# Patient Record
Sex: FEMALE | Race: ASIAN | Employment: FULL TIME | URBAN - METROPOLITAN AREA
[De-identification: names, ages, dates, MRNs, and addresses within clinical notes are randomized per-mention and may not be internally consistent; named-entity substitution may affect disease eponyms.]

---

## 2017-06-14 RX ORDER — ALBUTEROL SULFATE 90 UG/1
2 AEROSOL, METERED RESPIRATORY (INHALATION)
Qty: 1 INHALER | Refills: 2 | Status: SHIPPED | OUTPATIENT
Start: 2017-06-14 | End: 2018-06-26 | Stop reason: SDUPTHER

## 2017-08-05 ENCOUNTER — HOSPITAL ENCOUNTER (EMERGENCY)
Age: 37
Discharge: HOME | End: 2017-08-05
Attending: EMERGENCY MEDICINE

## 2017-08-05 VITALS
SYSTOLIC BLOOD PRESSURE: 177 MMHG | RESPIRATION RATE: 16 BRPM | TEMPERATURE: 98.3 F | DIASTOLIC BLOOD PRESSURE: 122 MMHG | BODY MASS INDEX: 30.12 KG/M2 | OXYGEN SATURATION: 98 % | HEIGHT: 63 IN | WEIGHT: 170 LBS | HEART RATE: 80 BPM

## 2017-08-05 DIAGNOSIS — J06.9 ACUTE UPPER RESPIRATORY INFECTION: Primary | ICD-10-CM

## 2017-08-05 DIAGNOSIS — I10 ESSENTIAL HYPERTENSION: ICD-10-CM

## 2017-08-05 NOTE — UC PROVIDER NOTE
Patient is a 39 y.o. female presenting with cold symptoms. The history is provided by the patient. Cold Symptoms    This is a new problem. The current episode started more than 2 days ago (since Lawrence sx with persistent sx since the onset ). The problem has not changed since onset. There has been no fever. Associated symptoms include congestion, plugged ear sensation (left), rhinorrhea and sore throat (Wed and Thurs but none since). Pertinent negatives include no chest pain, no diarrhea, no nausea, no ear pain (left ear fullness), no headaches, no sinus pain, no sneezing, no swollen glands, no joint pain, no joint swelling, no neck pain, no cough, no rash and no wheezing. Treatments tried: OTC cold medication -Nyquil-took last night. Past Medical History:   Diagnosis Date    Allergy, unspecified not elsewhere classified     Asthma childhood    Chicken pox childhood    Unspecified vitamin D deficiency 06/2011        History reviewed. No pertinent surgical history. Family History   Problem Relation Age of Onset    Diabetes Maternal Grandmother     Hypertension Mother     Asthma Mother     Heart Attack Maternal Grandmother     Stroke Maternal Grandmother         Social History     Social History    Marital status: SINGLE     Spouse name: N/A    Number of children: N/A    Years of education: N/A     Occupational History    Not on file. Social History Main Topics    Smoking status: Never Smoker    Smokeless tobacco: Never Used    Alcohol use Yes      Comment: occasionally monthly    Drug use: No    Sexual activity: Yes     Partners: Male     Birth control/ protection: Condom     Other Topics Concern    Not on file     Social History Narrative                ALLERGIES: Nut flavor and Nuts [tree nut]    Review of Systems   Constitutional: Negative for activity change, chills, fatigue and fever.    HENT: Positive for congestion, rhinorrhea and sore throat (Wed and Thurs but none since). Negative for ear pain (left ear fullness), sinus pressure and sneezing. Respiratory: Negative for cough and wheezing. Cardiovascular: Negative for chest pain. Gastrointestinal: Negative for diarrhea and nausea. Musculoskeletal: Negative for joint pain and neck pain. Skin: Negative for rash. Neurological: Negative for weakness and headaches. Vitals:    08/05/17 1112 08/05/17 1113   BP:  (!) 176/106   Pulse:  80   Resp:  16   Temp:  98.3 °F (36.8 °C)   SpO2:  98%   Weight: 77.1 kg (170 lb)    Height: 5' 3\" (1.6 m)        Physical Exam   Constitutional: She is oriented to person, place, and time. She appears well-developed and well-nourished. HENT:   Head: Normocephalic and atraumatic. Mouth/Throat: Oropharynx is clear and moist.   Nasal congestion, No sinus pain, PND, BL fluid behind the TM's without erythema     Eyes: Conjunctivae and EOM are normal.   Neck: Normal range of motion. Neck supple. Cardiovascular: Normal rate, regular rhythm and normal heart sounds. Pulmonary/Chest: Effort normal and breath sounds normal.   Lymphadenopathy:     She has no cervical adenopathy. Neurological: She is alert and oriented to person, place, and time. No cranial nerve deficit. Coordination normal.   Skin: Skin is warm and dry. Psychiatric: She has a normal mood and affect. Her behavior is normal. Thought content normal.   Nursing note and vitals reviewed. MDM     Differential Diagnosis; Clinical Impression; Plan:     Pt aware of BP and has been following it with her PCP for years but says it is better when she watches her diet and she said she will follow this at home and avoid cold medication  CLINICAL IMPRESSION:  Acute upper respiratory infection  (primary encounter diagnosis)  Essential hypertension    Plan:  1. Supportive care  2. BP recheck and close FU  3. Risk of Significant Complications, Morbidity, and/or Mortality:   Presenting problems:   Moderate  Management options: Moderate  Progress:   Patient progress:  Stable      Procedures

## 2017-08-05 NOTE — DISCHARGE INSTRUCTIONS
High Blood Pressure: Care Instructions  Your Care Instructions  If your blood pressure is usually above 140/90, you have high blood pressure, or hypertension. That means the top number is 140 or higher or the bottom number is 90 or higher, or both. Despite what a lot of people think, high blood pressure usually doesn't cause headaches or make you feel dizzy or lightheaded. It usually has no symptoms. But it does increase your risk for heart attack, stroke, and kidney or eye damage. The higher your blood pressure, the more your risk increases. Your doctor will give you a goal for your blood pressure. Your goal will be based on your health and your age. An example of a goal is to keep your blood pressure below 140/90. Lifestyle changes, such as eating healthy and being active, are always important to help lower blood pressure. You might also take medicine to reach your blood pressure goal.  Follow-up care is a key part of your treatment and safety. Be sure to make and go to all appointments, and call your doctor if you are having problems. It's also a good idea to know your test results and keep a list of the medicines you take. How can you care for yourself at home? Medical treatment  · If you stop taking your medicine, your blood pressure will go back up. You may take one or more types of medicine to lower your blood pressure. Be safe with medicines. Take your medicine exactly as prescribed. Call your doctor if you think you are having a problem with your medicine. · Talk to your doctor before you start taking aspirin every day. Aspirin can help certain people lower their risk of a heart attack or stroke. But taking aspirin isn't right for everyone, because it can cause serious bleeding. · See your doctor regularly. You may need to see the doctor more often at first or until your blood pressure comes down.   · If you are taking blood pressure medicine, talk to your doctor before you take decongestants or anti-inflammatory medicine, such as ibuprofen. Some of these medicines can raise blood pressure. · Learn how to check your blood pressure at home. Lifestyle changes  · Stay at a healthy weight. This is especially important if you put on weight around the waist. Losing even 10 pounds can help you lower your blood pressure. · If your doctor recommends it, get more exercise. Walking is a good choice. Bit by bit, increase the amount you walk every day. Try for at least 30 minutes on most days of the week. You also may want to swim, bike, or do other activities. · Avoid or limit alcohol. Talk to your doctor about whether you can drink any alcohol. · Try to limit how much sodium you eat to less than 2,300 milligrams (mg) a day. Your doctor may ask you to try to eat less than 1,500 mg a day. · Eat plenty of fruits (such as bananas and oranges), vegetables, legumes, whole grains, and low-fat dairy products. · Lower the amount of saturated fat in your diet. Saturated fat is found in animal products such as milk, cheese, and meat. Limiting these foods may help you lose weight and also lower your risk for heart disease. · Do not smoke. Smoking increases your risk for heart attack and stroke. If you need help quitting, talk to your doctor about stop-smoking programs and medicines. These can increase your chances of quitting for good. When should you call for help? Call 911 anytime you think you may need emergency care. This may mean having symptoms that suggest that your blood pressure is causing a serious heart or blood vessel problem. Your blood pressure may be over 180/110. For example, call 911 if:  · You have symptoms of a heart attack. These may include:  ¨ Chest pain or pressure, or a strange feeling in the chest.  ¨ Sweating. ¨ Shortness of breath. ¨ Nausea or vomiting. ¨ Pain, pressure, or a strange feeling in the back, neck, jaw, or upper belly or in one or both shoulders or arms.   ¨ Lightheadedness or sudden weakness. ¨ A fast or irregular heartbeat. · You have symptoms of a stroke. These may include:  ¨ Sudden numbness, tingling, weakness, or loss of movement in your face, arm, or leg, especially on only one side of your body. ¨ Sudden vision changes. ¨ Sudden trouble speaking. ¨ Sudden confusion or trouble understanding simple statements. ¨ Sudden problems with walking or balance. ¨ A sudden, severe headache that is different from past headaches. · You have severe back or belly pain. Do not wait until your blood pressure comes down on its own. Get help right away. Call your doctor now or seek immediate care if:  · Your blood pressure is much higher than normal (such as 180/110 or higher), but you don't have symptoms. · You think high blood pressure is causing symptoms, such as:  ¨ Severe headache. ¨ Blurry vision. Watch closely for changes in your health, and be sure to contact your doctor if:  · Your blood pressure measures 140/90 or higher at least 2 times. That means the top number is 140 or higher or the bottom number is 90 or higher, or both. · You think you may be having side effects from your blood pressure medicine. · Your blood pressure is usually normal, but it goes above normal at least 2 times. Where can you learn more? Go to http://jeremy-kizzy.info/. Enter Q400 in the search box to learn more about \"High Blood Pressure: Care Instructions. \"  Current as of: August 8, 2016  Content Version: 11.3  © 7701-9041 [employer]. Care instructions adapted under license by [employer] (which disclaims liability or warranty for this information). If you have questions about a medical condition or this instruction, always ask your healthcare professional. [place] [address] any warranty or liability for your use of this information.        Saline Nasal Washes: Care Instructions  Your Care Instructions  Saline nasal washes help keep the nasal passages open by washing out thick or dried mucus. This simple remedy can help relieve symptoms of allergies, sinusitis, and colds. It also can make the nose feel more comfortable by keeping the mucous membranes moist. You may notice a little burning sensation in your nose the first few times you use the solution, but this usually gets better in a few days. Follow-up care is a key part of your treatment and safety. Be sure to make and go to all appointments, and call your doctor if you are having problems. It's also a good idea to know your test results and keep a list of the medicines you take. How can you care for yourself at home? · You can buy premixed saline solution in a squeeze bottle or other sinus rinse products at a drugstore. Read and follow the instructions on the label. · You also can make your own saline solution by adding 1 teaspoon of salt and 1 teaspoon of baking soda to 2 cups of distilled water. · If you use a homemade solution, pour a small amount into a clean bowl. Using a rubber bulb syringe, squeeze the syringe and place the tip in the salt water. Pull a small amount of the salt water into the syringe by relaxing your hand. · Sit down with your head tilted slightly back. Do not lie down. Put the tip of the bulb syringe or the squeeze bottle a little way into one of your nostrils. Gently drip or squirt a few drops into the nostril. Repeat with the other nostril. Some sneezing and gagging are normal at first.  · Gently blow your nose. · Wipe the syringe or bottle tip clean after each use. · Repeat this 2 or 3 times a day. · Use nasal washes gently if you have nosebleeds often. When should you call for help? Watch closely for changes in your health, and be sure to contact your doctor if:  · You often get nosebleeds. · You have problems doing the nasal washes. Where can you learn more? Go to http://jeremy-kizzy.info/.   Enter [id number] in the search box to learn more about \"Saline Nasal Washes: Care Instructions. \"  Current as of: May 4, 2017  Content Version: 11.3  © 3114-0929 [employer]. Care instructions adapted under license by [employer] (which disclaims liability or warranty for this information). If you have questions about a medical condition or this instruction, always ask your healthcare professional. [address] any warranty or liability for your use of this information. Upper Respiratory Infection (Cold): Care Instructions  Your Care Instructions    An upper respiratory infection, or URI, is an infection of the nose, sinuses, or throat. URIs are spread by coughs, sneezes, and direct contact. The common cold is the most frequent kind of URI. The flu and sinus infections are other kinds of URIs. Almost all URIs are caused by viruses. Antibiotics won't cure them. But you can treat most infections with home care. This may include drinking lots of fluids and taking over-the-counter pain medicine. You will probably feel better in 4 to 10 days. The doctor has checked you carefully, but problems can develop later. If you notice any problems or new symptoms, get medical treatment right away. Follow-up care is a key part of your treatment and safety. Be sure to make and go to all appointments, and call your doctor if you are having problems. It's also a good idea to know your test results and keep a list of the medicines you take. How can you care for yourself at home? · To prevent dehydration, drink plenty of fluids, enough so that your urine is light yellow or clear like water. Choose water and other caffeine-free clear liquids until you feel better. If you have kidney, heart, or liver disease and have to limit fluids, talk with your doctor before you increase the amount of fluids you drink.   · Take an over-the-counter pain medicine, such as acetaminophen (Tylenol), ibuprofen (Advil, Motrin), or naproxen (Yuni). Read and follow all instructions on the label. · Before you use cough and cold medicines, check the label. These medicines may not be safe for young children or for people with certain health problems. · Be careful when taking over-the-counter cold or flu medicines and Tylenol at the same time. Many of these medicines have acetaminophen, which is Tylenol. Read the labels to make sure that you are not taking more than the recommended dose. Too much acetaminophen (Tylenol) can be harmful. · Get plenty of rest.  · Do not smoke or allow others to smoke around you. If you need help quitting, talk to your doctor about stop-smoking programs and medicines. These can increase your chances of quitting for good. When should you call for help? Call 911 anytime you think you may need emergency care. For example, call if:  · You have severe trouble breathing. Call your doctor now or seek immediate medical care if:  · You seem to be getting much sicker. · You have new or worse trouble breathing. · You have a new or higher fever. · You have a new rash. Watch closely for changes in your health, and be sure to contact your doctor if:  · You have a new symptom, such as a sore throat, an earache, or sinus pain. · You cough more deeply or more often, especially if you notice more mucus or a change in the color of your mucus. · You do not get better as expected. Where can you learn more? Go to http://jeremy-kizzy.info/. Enter N160 in the search box to learn more about \"Upper Respiratory Infection (Cold): Care Instructions. \"  Current as of: March 25, 2017  Content Version: 11.3  © 0191-6363 [employer]. Care instructions adapted under license by [employer] (which disclaims liability or warranty for this information).  If you have questions about a medical condition or this instruction, always ask your healthcare professional. Dulce Maria Kat disclaims any warranty or liability for your use of this information.

## 2018-04-04 ENCOUNTER — OFFICE VISIT (OUTPATIENT)
Dept: FAMILY MEDICINE CLINIC | Age: 38
End: 2018-04-04

## 2018-04-04 VITALS
HEART RATE: 76 BPM | DIASTOLIC BLOOD PRESSURE: 90 MMHG | RESPIRATION RATE: 16 BRPM | OXYGEN SATURATION: 100 % | SYSTOLIC BLOOD PRESSURE: 140 MMHG | TEMPERATURE: 98.7 F | WEIGHT: 175.2 LBS | BODY MASS INDEX: 31.04 KG/M2 | HEIGHT: 63 IN

## 2018-04-04 DIAGNOSIS — J45.990 EXERCISE-INDUCED ASTHMA: ICD-10-CM

## 2018-04-04 DIAGNOSIS — R22.41 MASS OF RIGHT FOOT: ICD-10-CM

## 2018-04-04 DIAGNOSIS — J30.9 ALLERGIC RHINITIS, UNSPECIFIED SEASONALITY, UNSPECIFIED TRIGGER: Primary | ICD-10-CM

## 2018-04-04 DIAGNOSIS — R03.0 ELEVATED BLOOD PRESSURE READING WITHOUT DIAGNOSIS OF HYPERTENSION: ICD-10-CM

## 2018-04-04 DIAGNOSIS — Z91.018 NUT ALLERGY: ICD-10-CM

## 2018-04-04 RX ORDER — EPINEPHRINE 0.3 MG/.3ML
0.3 INJECTION SUBCUTANEOUS
Qty: 2 SYRINGE | Refills: 2 | Status: SHIPPED | OUTPATIENT
Start: 2018-04-04 | End: 2021-09-01 | Stop reason: SDUPTHER

## 2018-04-04 RX ORDER — MONTELUKAST SODIUM 10 MG/1
10 TABLET ORAL DAILY
Qty: 30 TAB | Refills: 2 | Status: SHIPPED | OUTPATIENT
Start: 2018-04-04 | End: 2019-12-27

## 2018-04-04 NOTE — PATIENT INSTRUCTIONS
Allergies: Care Instructions  Your Care Instructions    Allergies occur when your body's defense system (immune system) overreacts to certain substances. The immune system treats a harmless substance as if it were a harmful germ or virus. Many things can cause this overreaction, including pollens, medicine, food, dust, animal dander, and mold. Allergies can be mild or severe. Mild allergies can be managed with home treatment. But medicine may be needed to prevent problems. Managing your allergies is an important part of staying healthy. Your doctor may suggest that you have allergy testing to help find out what is causing your allergies. When you know what things trigger your symptoms, you can avoid them. This can prevent allergy symptoms and other health problems. For severe allergies that cause reactions that affect your whole body (anaphylactic reactions), your doctor may prescribe a shot of epinephrine to carry with you in case you have a severe reaction. Learn how to give yourself the shot and keep it with you at all times. Make sure it is not . Follow-up care is a key part of your treatment and safety. Be sure to make and go to all appointments, and call your doctor if you are having problems. It's also a good idea to know your test results and keep a list of the medicines you take. How can you care for yourself at home? · If you have been told by your doctor that dust or dust mites are causing your allergy, decrease the dust around your bed:  [facility] sheets, pillowcases, and other bedding in hot water every week. ¨ Use dust-proof covers for pillows, duvets, and mattresses. Avoid plastic covers because they tear easily and do not \"breathe. \" Wash as instructed on the label. ¨ Do not use any blankets and pillows that you do not need. ¨ Use blankets that you can wash in your washing machine. ¨ Consider removing drapes and carpets, which attract and hold dust, from your bedroom.   · If you are allergic to house dust and mites, do not use home humidifiers. Your doctor can suggest ways you can control dust and mites. · Look for signs of cockroaches. Cockroaches cause allergic reactions. Use cockroach baits to get rid of them. Then, clean your home well. Cockroaches like areas where grocery bags, newspapers, empty bottles, or cardboard boxes are stored. Do not keep these inside your home, and keep trash and food containers sealed. Seal off any spots where cockroaches might enter your home. · If you are allergic to mold, get rid of furniture, rugs, and drapes that smell musty. Check for mold in the bathroom. · If you are allergic to outdoor pollen or mold spores, use air-conditioning. Change or clean all filters every month. Keep windows closed. · If you are allergic to pollen, stay inside when pollen counts are high. Use a vacuum  with a HEPA filter or a double-thickness filter at least two times each week. · Stay inside when air pollution is bad. Avoid paint fumes, perfumes, and other strong odors. · Avoid conditions that make your allergies worse. Stay away from smoke. Do not smoke or let anyone else smoke in your house. Do not use fireplaces or wood-burning stoves. · If you are allergic to your pets, change the air filter in your furnace every month. Use high-efficiency filters. · If you are allergic to pet dander, keep pets outside or out of your bedroom. Old carpet and cloth furniture can hold a lot of animal dander. You may need to replace them. When should you call for help? Give an epinephrine shot if:  ? · You think you are having a severe allergic reaction. ? · You have symptoms in more than one body area, such as mild nausea and an itchy mouth. ? After giving an epinephrine shot call 911, even if you feel better. ?Call 911 if:  ? · You have symptoms of a severe allergic reaction. These may include:  ¨ Sudden raised, red areas (hives) all over your body.   ¨ Swelling of the throat, mouth, lips, or tongue. ¨ Trouble breathing. ¨ Passing out (losing consciousness). Or you may feel very lightheaded or suddenly feel weak, confused, or restless. ? · You have been given an epinephrine shot, even if you feel better. ?Call your doctor now or seek immediate medical care if:  ? · You have symptoms of an allergic reaction, such as:  ¨ A rash or hives (raised, red areas on the skin). ¨ Itching. ¨ Swelling. ¨ Belly pain, nausea, or vomiting. ? Watch closely for changes in your health, and be sure to contact your doctor if:  ? · You do not get better as expected. Where can you learn more? Go to http://jeremy-kizzy.info/. Enter D260 in the search box to learn more about \"Allergies: Care Instructions. \"  Current as of: September 29, 2016  Content Version: 11.4  © 2643-6689 [employer]. Care instructions adapted under license by [employer] (which disclaims liability or warranty for this information). If you have questions about a medical condition or this instruction, always ask your healthcare professional. [place] [address] any warranty or liability for your use of this information. Elevated Blood Pressure: Care Instructions  Your Care Instructions    Blood pressure is a measure of how hard the blood pushes against the walls of your arteries. It's normal for blood pressure to go up and down throughout the day. But if it stays up over time, you have high blood pressure. Two numbers tell you your blood pressure. The first number is the systolic pressure. It shows how hard the blood pushes when your heart is pumping. The second number is the diastolic pressure. It shows how hard the blood pushes between heartbeats, when your heart is relaxed and filling with blood. An ideal blood pressure in adults is less than 120/80 (say \"120 over 80\"). High blood pressure is 140/90 or higher.  You have high blood pressure if your top number is 140 or higher or your bottom number is 90 or higher, or both. The main test for high blood pressure is simple, fast, and painless. To diagnose high blood pressure, your doctor will test your blood pressure at different times. After testing your blood pressure, your doctor may ask you to test it again when you are home. If you are diagnosed with high blood pressure, you can work with your doctor to make a long-term plan to manage it. Follow-up care is a key part of your treatment and safety. Be sure to make and go to all appointments, and call your doctor if you are having problems. It's also a good idea to know your test results and keep a list of the medicines you take. How can you care for yourself at home? · Do not smoke. Smoking increases your risk for heart attack and stroke. If you need help quitting, talk to your doctor about stop-smoking programs and medicines. These can increase your chances of quitting for good. · Stay at a healthy weight. · Try to limit how much sodium you eat to less than 2,300 milligrams (mg) a day. Your doctor may ask you to try to eat less than 1,500 mg a day. · Be physically active. Get at least 30 minutes of exercise on most days of the week. Walking is a good choice. You also may want to do other activities, such as running, swimming, cycling, or playing tennis or team sports. · Avoid or limit alcohol. Talk to your doctor about whether you can drink any alcohol. · Eat plenty of fruits, vegetables, and low-fat dairy products. Eat less saturated and total fats. · Learn how to check your blood pressure at home. When should you call for help? Call your doctor now or seek immediate medical care if:  ? · Your blood pressure is much higher than normal (such as 180/110 or higher). ? · You think high blood pressure is causing symptoms such as:  ¨ Severe headache. ¨ Blurry vision. ? Watch closely for changes in your health, and be sure to contact your doctor if:  ? · You do not get better as expected. Where can you learn more? Go to http://jeremy-kizzy.info/. Enter D789 in the search box to learn more about \"Elevated Blood Pressure: Care Instructions. \"  Current as of: September 21, 2016  Content Version: 11.4  © 0435-8404 [employer]. Care instructions adapted under license by [employer] (which disclaims liability or warranty for this information). If you have questions about a medical condition or this instruction, always ask your healthcare professional. [address] any warranty or liability for your use of this information.

## 2018-04-04 NOTE — PROGRESS NOTES
Chief Complaint   Patient presents with    Allergies     Doing alright for the last 4 to 5 years. Started again about a week ago. Sneezing, coughing, stuffy nose. Mucus has been yellow and green.  Warts     Right foot on the bottom. Started a couple of weeks ago. HPI:  The patient is a 40 y.o. female who presents today for URI symptoms. Agree with nurse's note. SUBJECTIVE:   URI:  Fredda Else complains of congestion, sneezing, sore throat, productive cough and yellow/green nasal discharge for 7 days, gradually worsening since that time. She denies a history of chest pain, chills, fevers, myalgias, nausea, shortness of breath, vomiting and wheezing. Tried OTC cold remedies with temporary relief including nasal spray, vitamins. Patient admits to a history of asthma. Patient does not smoke cigarettes. Any evaluation to date? no  Recent Travel? no  Sick Contacts? Coworker was ill last week    FLU VACCINE? no There is no immunization history for the selected administration types on file for this patient. Pneumonia Vaccine? no There is no immunization history for the selected administration types on file for this patient. Chart reviewed: immunizations are up to date and documented. Possible Plantar Wart: right foot, first noticed a couple weeks ago    Review of Systems  A comprehensive review of systems was negative except for that written in the HPI.     Patient Active Problem List   Diagnosis Code    Allergy, unspecified not elsewhere classified T78.40XA    Exercise-induced asthma J45.990    Unspecified vitamin D deficiency E55.9    Elevated blood pressure reading without diagnosis of hypertension R03.0    Nut allergy Z91.018    Allergic rhinitis J30.9    Mass of right foot R22.41       Past Medical History:   Diagnosis Date    Allergy, unspecified not elsewhere classified     Asthma childhood    Chicken pox childhood    Unspecified vitamin D deficiency 06/2011       History reviewed. No pertinent surgical history. Social History   Substance Use Topics    Smoking status: Never Smoker    Smokeless tobacco: Never Used    Alcohol use Yes      Comment: occasionally monthly       Family History   Problem Relation Age of Onset    Diabetes Maternal Grandmother     Heart Attack Maternal Grandmother     Stroke Maternal Grandmother     Hypertension Mother     Asthma Mother        Outpatient Prescriptions Marked as Taking for the 4/4/18 encounter (Office Visit) with Espinoza Tavarez NP   Medication Sig Dispense Refill    EPINEPHrine (EPIPEN) 0.3 mg/0.3 mL injection 0.3 mL by IntraMUSCular route once as needed. 2 Syringe 2    montelukast (SINGULAIR) 10 mg tablet Take 1 Tab by mouth daily. 30 Tab 2    albuterol (PROVENTIL HFA, VENTOLIN HFA, PROAIR HFA) 90 mcg/actuation inhaler Take 2 Puffs by inhalation every four (4) hours as needed for Wheezing. 1 Inhaler 2       Allergies   Allergen Reactions    Nut Flavor Hives    Nuts [Tree Nut] Anaphylaxis     Tongue swelling, sob, mouth and facial swelling. Patient states that she is allergic to all nuts. OBJECTIVE:    PE:  Visit Vitals    /90 (BP 1 Location: Left arm, BP Patient Position: Sitting)  Comment: Manual cuff    Pulse 76    Temp 98.7 °F (37.1 °C) (Oral)    Resp 16    Ht 5' 3\" (1.6 m)    Wt 175 lb 3.2 oz (79.5 kg)    LMP 03/13/2018    SpO2 100%    BMI 31.04 kg/m2     She appears well, vital signs are as noted above   PAIN: No complaints of pain today. HEAD:  Normocephalic. Atraumatic.  + tender sinuses x 4. EYE: PERRLA. EOMs intact. Sclera anicteric without injection. No drainage or discharge. EARS: Hearing intact bilaterally. External ear canals normal without evidence of blood or swelling. Bilateral TM's intact, pearly grey with landmarks visible. + erythema or effusion. NOSE: Patent. Nasal turbinates pink. No polyps noted. + erythema/edema. + clear  discharge.     MOUTH: mucous membranes pink and moist. Posterior pharynx normal with cobblestone appearance. + erythema, no white exudate or obstruction. NECK: supple. Midline trachea. RESP: Breath sounds are symmetrical bilaterally. Unlabored without SOB. Speaking in full sentences. Clear to auscultation bilaterally anteriorly and posteriorly. No wheezes. No rales or rhonchi. Non-productive cough when elicited. CV: normal rate. Regular rhythm. S1, S2 audible. No murmur noted. No rubs, clicks or gallops noted. HEME/LYMPH: peripheral pulses palpable 2+ x 4 extremities. No peripheral edema is noted. + cervical adenopathy noted. SKIN: clean dry and intact throughout. no rashes, erythema, ecchymosis, lacerations, abrasions, suspicious moles noted    No results found for this visit on 04/04/18. Assessment/Plan:  Differential diagnosis and treatment options reviewed with patient who is in agreement with treatment plan as outlined below. ICD-10-CM ICD-9-CM    1. Allergic rhinitis, unspecified seasonality, unspecified trigger J30.9 477.9 montelukast (SINGULAIR) 10 mg tablet   2. Elevated blood pressure reading without diagnosis of hypertension R03.0 796.2    3. Exercise-induced asthma J45.990 493.81 montelukast (SINGULAIR) 10 mg tablet   4. Nut allergy Z91.018 V15.05 EPINEPHrine (EPIPEN) 0.3 mg/0.3 mL injection      montelukast (SINGULAIR) 10 mg tablet   5. Mass of right foot R22.41 782.2 REFERRAL TO PODIATRY     Follow-up Disposition:  Return if symptoms worsen or fail to improve. reviewed diet, exercise and weight control  reviewed medications and side effects in detail    Symptomatic therapy suggested: push fluids, rest, use acetaminophen, ibuprofen, antihistamine-decongestant of choice, cough suppressant of choice prn and return office visit prn if symptoms persist or worsen. Lack of antibiotic effectiveness discussed with her. Call or return to clinic prn if these symptoms worsen or fail to improve as anticipated.     Health Maintenance reviewed - reviewed, UTD. Recommended healthy diet low in carbohydrates, fats, sodium and cholesterol. Recommended regular cardiovascular exercise 3-6 times per week for 30-60 minutes daily. Chart is reviewed and updated today in the office. Records requested for other providers patient has seen and is currently seeing. Verbal and written instructions (see AVS) provided. Patient expresses understanding of diagnosis and treatment plan.

## 2018-04-04 NOTE — PROGRESS NOTES
Regisbedee Spotted  Identified pt with two pt identifiers(name and ). Chief Complaint   Patient presents with    Allergies     Doing alright for the last 4 to 5 years. Started again about a week ago. Sneezing, coughing, stuffy nose. Mucus has been yellow and green.  Warts     Right foot on the bottom. Started a couple of weeks ago. 1. Have you been to the ER, urgent care clinic since your last visit? Hospitalized since your last visit? Yes [facility]    2. Have you seen or consulted any other health care providers outside of the [address] [place] [address] since your last visit? Include any pap smears or colon screening. NO    Today's provider has been notified of reason for visit, vitals and flowsheets obtained on patients. Patient received paperwork for advance directive during previous visit but has not completed at this time     Reviewed record In preparation for visit, huddled with provider and have obtained necessary documentation      Health Maintenance Due   Topic    Pneumococcal 19-64 Medium Risk (1 of 1 - PPSV23)    Influenza Age 5 to Adult     PAP AKA CERVICAL CYTOLOGY        Wt Readings from Last 3 Encounters:   18 175 lb 3.2 oz (79.5 kg)   17 170 lb (77.1 kg)   01/20/15 174 lb (78.9 kg)     Temp Readings from Last 3 Encounters:   17 98.3 °F (36.8 °C)   01/20/15 98.6 °F (37 °C)   14 97.8 °F (36.6 °C) (Oral)     BP Readings from Last 3 Encounters:   17 (!) 177/122   01/20/15 (!) 160/107   14 132/86     Pulse Readings from Last 3 Encounters:   17 80   01/20/15 80   14 75     Vitals:    18 1345   Weight: 175 lb 3.2 oz (79.5 kg)   Height: 5' 3\" (1.6 m)   PainSc:   0 - No pain   LMP: 2018         Learning Assessment:  :     No flowsheet data found.     Depression Screening:  :     PHQ over the last two weeks 2018   Little interest or pleasure in doing things Not at all   Feeling down, depressed or hopeless Not at all   Total Score PHQ 2 0       Fall Risk Assessment:  :     No flowsheet data found. Abuse Screening:  :     Abuse Screening Questionnaire 1/20/2015   Do you ever feel afraid of your partner? N   Are you in a relationship with someone who physically or mentally threatens you? N   Is it safe for you to go home? Y       ADL Screening:  :     ADL Assessment 4/4/2018   Feeding yourself No Help Needed   Getting from bed to chair No Help Needed   Getting dressed No Help Needed   Bathing or showering No Help Needed   Walk across the room (includes cane/walker) No Help Needed   Using the telphone No Help Needed   Taking your medications No Help Needed   Preparing meals No Help Needed   Managing money (expenses/bills) No Help Needed   Moderately strenuous housework (laundry) No Help Needed   Shopping for personal items (toiletries/medicines) No Help Needed   Shopping for groceries No Help Needed   Driving No Help Needed   Climbing a flight of stairs No Help Needed   Getting to places beyond walking distances No Help Needed                 Medication reconciliation up to date and corrected with patient at this time.

## 2018-04-04 NOTE — MR AVS SNAPSHOT
[address] [place] [address] 
[address] [place] [address] 
[phone] Patient: Marielena Negron MRN: [id number] GNE:[birth year] Visit Information Date & Time Provider Department Dept. Phone Encounter #  
 4/4/2018  1:40 PM Roland Cuevas NP [facility] [phone] [id number] Follow-up Instructions Return if symptoms worsen or fail to improve. Upcoming Health Maintenance Date Due  
 PAP AKA CERVICAL CYTOLOGY 7/3/2018* Influenza Age 5 to Adult 7/3/2018* Pneumococcal 19-64 Medium Risk (1 of 1 - PPSV23) 4/4/2019* DTaP/Tdap/Td series (3 - Td) 6/13/2021 *Topic was postponed. The date shown is not the original due date. Allergies as of 4/4/2018  Review Complete On: 4/4/2018 By: Roland Cuevas NP Severity Noted Reaction Type Reactions Nut Flavor High 01/20/2015    Hives Nuts [Tree Nut] High 01/20/2015   Systemic Anaphylaxis Tongue swelling, sob, mouth and facial swelling. Patient states that she is allergic to all nuts. Current Immunizations  Reviewed on 7/11/2012 Name Date  
 TD Vaccine 1/1/2000 TDAP Vaccine 6/13/2011 Not reviewed this visit You Were Diagnosed With   
  
 Codes Comments Allergic rhinitis, unspecified seasonality, unspecified trigger    -  Primary ICD-10-CM: J30.9 ICD-9-CM: 477.9 Elevated blood pressure reading without diagnosis of hypertension     ICD-10-CM: R03.0 ICD-9-CM: 796.2 Exercise-induced asthma     ICD-10-CM: J45.990 ICD-9-CM: 493.81 Nut allergy     ICD-10-CM: Z91.018 
ICD-9-CM: V15.05 Mass of right foot     ICD-10-CM: R22.41 
ICD-9-CM: 689. 2 Vitals BP Pulse Temp Resp Height(growth percentile) Weight(growth percentile) 140/90 (BP 1 Location: Left arm, BP Patient Position: Sitting) 76 98.7 °F (37.1 °C) (Oral) 16 5' 3\" (1.6 m) 175 lb 3.2 oz (79.5 kg) LMP SpO2 BMI OB Status Smoking Status 2018 100% 31.04 kg/m2 Having regular periods Never Smoker Vitals History BMI and BSA Data Body Mass Index Body Surface Area 31.04 kg/m 2 1.88 m 2 Preferred Pharmacy Pharmacy Name Phone [facility]  [address], [place] [address] [phone] Your Updated Medication List  
  
   
This list is accurate as of 18  3:04 PM.  Always use your most recent med list.  
  
  
  
  
 albuterol 90 mcg/actuation inhaler Commonly known as:  PROVENTIL HFA, VENTOLIN HFA, PROAIR HFA Take 2 Puffs by inhalation every four (4) hours as needed for Wheezing. EPINEPHrine 0.3 mg/0.3 mL injection Commonly known as:  EPIPEN  
0.3 mL by IntraMUSCular route once as needed. montelukast 10 mg tablet Commonly known as:  SINGULAIR Take 1 Tab by mouth daily. Prescriptions Sent to Pharmacy Refills EPINEPHrine (EPIPEN) 0.3 mg/0.3 mL injection 2 Si.3 mL by IntraMUSCular route once as needed. Class: Normal  
 Pharmacy: [address] Ph #: [phone] Route: IntraMUSCular  
 montelukast (SINGULAIR) 10 mg tablet 2 Sig: Take 1 Tab by mouth daily. Class: Normal  
 Pharmacy: [address] Ph #: [phone] Route: Oral  
  
We Performed the Following REFERRAL TO PODIATRY [REF90 Custom] Follow-up Instructions Return if symptoms worsen or fail to improve. Referral Information Referral ID Referred By Referred To  
  
 [id number] [facility] [address] Visits Status Start Date End Date 1 New Request 18 If your referral has a status of pending review or denied, additional information will be sent to support the outcome of this decision. Patient Instructions Allergies: Care Instructions Your Care Instructions Allergies occur when your body's defense system (immune system) overreacts to certain substances. The immune system treats a harmless substance as if it were a harmful germ or virus. Many things can cause this overreaction, including pollens, medicine, food, dust, animal dander, and mold. Allergies can be mild or severe. Mild allergies can be managed with home treatment. But medicine may be needed to prevent problems. Managing your allergies is an important part of staying healthy. Your doctor may suggest that you have allergy testing to help find out what is causing your allergies. When you know what things trigger your symptoms, you can avoid them. This can prevent allergy symptoms and other health problems. For severe allergies that cause reactions that affect your whole body (anaphylactic reactions), your doctor may prescribe a shot of epinephrine to carry with you in case you have a severe reaction. Learn how to give yourself the shot and keep it with you at all times. Make sure it is not . Follow-up care is a key part of your treatment and safety. Be sure to make and go to all appointments, and call your doctor if you are having problems. It's also a good idea to know your test results and keep a list of the medicines you take. How can you care for yourself at home? · If you have been told by your doctor that dust or dust mites are causing your allergy, decrease the dust around your bed: 
[facility] sheets, pillowcases, and other bedding in hot water every week. ¨ Use dust-proof covers for pillows, duvets, and mattresses. Avoid plastic covers because they tear easily and do not \"breathe. \" Wash as instructed on the label. ¨ Do not use any blankets and pillows that you do not need. ¨ Use blankets that you can wash in your washing machine. ¨ Consider removing drapes and carpets, which attract and hold dust, from your bedroom. · If you are allergic to house dust and mites, do not use home humidifiers. Your doctor can suggest ways you can control dust and mites. · Look for signs of cockroaches. Cockroaches cause allergic reactions. Use cockroach baits to get rid of them. Then, clean your home well. Cockroaches like areas where grocery bags, newspapers, empty bottles, or cardboard boxes are stored. Do not keep these inside your home, and keep trash and food containers sealed. Seal off any spots where cockroaches might enter your home. · If you are allergic to mold, get rid of furniture, rugs, and drapes that smell musty. Check for mold in the bathroom. · If you are allergic to outdoor pollen or mold spores, use air-conditioning. Change or clean all filters every month. Keep windows closed. · If you are allergic to pollen, stay inside when pollen counts are high. Use a vacuum  with a HEPA filter or a double-thickness filter at least two times each week. · Stay inside when air pollution is bad. Avoid paint fumes, perfumes, and other strong odors. · Avoid conditions that make your allergies worse. Stay away from smoke. Do not smoke or let anyone else smoke in your house. Do not use fireplaces or wood-burning stoves. · If you are allergic to your pets, change the air filter in your furnace every month. Use high-efficiency filters. · If you are allergic to pet dander, keep pets outside or out of your bedroom. Old carpet and cloth furniture can hold a lot of animal dander. You may need to replace them. When should you call for help? Give an epinephrine shot if: 
? · You think you are having a severe allergic reaction. ? · You have symptoms in more than one body area, such as mild nausea and an itchy mouth. ? After giving an epinephrine shot call 911, even if you feel better. ?Call 911 if: 
? · You have symptoms of a severe allergic reaction. These may include: 
¨ Sudden raised, red areas (hives) all over your body. ¨ Swelling of the throat, mouth, lips, or tongue. ¨ Trouble breathing. ¨ Passing out (losing consciousness). Or you may feel very lightheaded or suddenly feel weak, confused, or restless. ? · You have been given an epinephrine shot, even if you feel better. ?Call your doctor now or seek immediate medical care if: 
? · You have symptoms of an allergic reaction, such as: ¨ A rash or hives (raised, red areas on the skin). ¨ Itching. ¨ Swelling. ¨ Belly pain, nausea, or vomiting. ? Watch closely for changes in your health, and be sure to contact your doctor if: 
? · You do not get better as expected. Where can you learn more? Go to http://jeremy-kizzy.info/. Enter C608 in the search box to learn more about \"Allergies: Care Instructions. \" Current as of: September 29, 2016 Content Version: 11.4 © 9141-5694 [employer]. Care instructions adapted under license by [employer] (which disclaims liability or warranty for this information). If you have questions about a medical condition or this instruction, always ask your healthcare professional. [place] [address] any warranty or liability for your use of this information. Elevated Blood Pressure: Care Instructions Your Care Instructions Blood pressure is a measure of how hard the blood pushes against the walls of your arteries. It's normal for blood pressure to go up and down throughout the day. But if it stays up over time, you have high blood pressure. Two numbers tell you your blood pressure. The first number is the systolic pressure. It shows how hard the blood pushes when your heart is pumping. The second number is the diastolic pressure. It shows how hard the blood pushes between heartbeats, when your heart is relaxed and filling with blood. An ideal blood pressure in adults is less than 120/80 (say \"120 over 80\"). High blood pressure is 140/90 or higher.  You have high blood pressure if your top number is 140 or higher or your bottom number is 90 or higher, or both. The main test for high blood pressure is simple, fast, and painless. To diagnose high blood pressure, your doctor will test your blood pressure at different times. After testing your blood pressure, your doctor may ask you to test it again when you are home. If you are diagnosed with high blood pressure, you can work with your doctor to make a long-term plan to manage it. Follow-up care is a key part of your treatment and safety. Be sure to make and go to all appointments, and call your doctor if you are having problems. It's also a good idea to know your test results and keep a list of the medicines you take. How can you care for yourself at home? · Do not smoke. Smoking increases your risk for heart attack and stroke. If you need help quitting, talk to your doctor about stop-smoking programs and medicines. These can increase your chances of quitting for good. · Stay at a healthy weight. · Try to limit how much sodium you eat to less than 2,300 milligrams (mg) a day. Your doctor may ask you to try to eat less than 1,500 mg a day. · Be physically active. Get at least 30 minutes of exercise on most days of the week. Walking is a good choice. You also may want to do other activities, such as running, swimming, cycling, or playing tennis or team sports. · Avoid or limit alcohol. Talk to your doctor about whether you can drink any alcohol. · Eat plenty of fruits, vegetables, and low-fat dairy products. Eat less saturated and total fats. · Learn how to check your blood pressure at home. When should you call for help? Call your doctor now or seek immediate medical care if: 
? · Your blood pressure is much higher than normal (such as 180/110 or higher). ? · You think high blood pressure is causing symptoms such as: ¨ Severe headache. ¨ Blurry vision. ?Watch closely for changes in your health, and be sure to contact your doctor if: 
? · You do not get better as expected. Where can you learn more? Go to http://jeremy-kizzy.info/. Enter K364 in the search box to learn more about \"Elevated Blood Pressure: Care Instructions. \" Current as of: September 21, 2016 Content Version: 11.4 © 7113-2767 [employer]. Care instructions adapted under license by [employer] (which disclaims liability or warranty for this information). If you have questions about a medical condition or this instruction, always ask your healthcare professional. [place] [address] any warranty or liability for your use of this information. Introducing [facility]! [employer] introduces Mah[employer]ra REVA patient portal. Now you can access parts of your medical record, email your doctor's office, and request medication refills online. 1. In your internet browser, go to [email or web address] 2. Click on the First Time User? Click Here link in the Sign In box. You will see the New Member Sign Up page. 3. Enter your Mahindra REVA Access Code exactly as it appears below. You will not need to use this code after youve completed the sign-up process. If you do not sign up before the expiration date, you must request a new code. · Mahindra REVA Access Code: [email or web address] Expires: 7/3/2018  3:04 PM 
 
4. Enter the last four digits of your Social Security Number (xxxx) and Date of Birth (mm/dd/yyyy) as indicated and click Submit. You will be taken to the next sign-up page. 5. Create a Mahindra REVA ID. This will be your Mahindra REVA login ID and cannot be changed, so think of one that is secure and easy to remember. 6. Create a Mah[employer]ra REVA password. You can change your password at any time. 7. Enter your Password Reset Question and Answer. This can be used at a later time if you forget your password. 8. Enter your e-mail address. You will receive e-mail notification when new information is available in [address] 9. Click Sign Up. You can now view and download portions of your medical record. 10. Click the Download Summary menu link to download a portable copy of your medical information. If you have questions, please visit the Frequently Asked Questions section of the [employer] website. Remember, [employer]ender is NOT to be used for urgent needs. For medical emergencies, dial 911. Now available from your iPhone and Android! Please provide this summary of care documentation to your next provider. Your primary care clinician is listed as Aries Rhoades. If you have any questions after today's visit, please call [phone].

## 2019-12-27 ENCOUNTER — OFFICE VISIT (OUTPATIENT)
Dept: FAMILY MEDICINE CLINIC | Age: 39
End: 2019-12-27

## 2019-12-27 VITALS
TEMPERATURE: 98.1 F | DIASTOLIC BLOOD PRESSURE: 100 MMHG | OXYGEN SATURATION: 99 % | WEIGHT: 189.1 LBS | SYSTOLIC BLOOD PRESSURE: 149 MMHG | HEIGHT: 63 IN | HEART RATE: 70 BPM | RESPIRATION RATE: 16 BRPM | BODY MASS INDEX: 33.5 KG/M2

## 2019-12-27 DIAGNOSIS — Z02.89 ENCOUNTER FOR COMPLETION OF FORM WITH PATIENT: Primary | ICD-10-CM

## 2019-12-27 DIAGNOSIS — I10 ESSENTIAL HYPERTENSION: ICD-10-CM

## 2019-12-27 DIAGNOSIS — R63.5 WEIGHT GAIN: ICD-10-CM

## 2019-12-27 DIAGNOSIS — Z00.00 LABORATORY EXAM ORDERED AS PART OF ROUTINE GENERAL MEDICAL EXAMINATION: ICD-10-CM

## 2019-12-27 RX ORDER — AMOXICILLIN AND CLAVULANATE POTASSIUM 875; 125 MG/1; MG/1
1 TABLET, FILM COATED ORAL EVERY 12 HOURS
Qty: 20 TAB | Refills: 0 | Status: SHIPPED | OUTPATIENT
Start: 2019-12-27 | End: 2019-12-27 | Stop reason: CLARIF

## 2019-12-27 RX ORDER — CHLORTHALIDONE 25 MG/1
25 TABLET ORAL DAILY
Qty: 30 TAB | Refills: 1 | Status: SHIPPED | OUTPATIENT
Start: 2019-12-27 | End: 2020-02-29

## 2019-12-27 NOTE — PROGRESS NOTES
S: Rocio Delgado is a 44 y.o.  female who presents for biometric form completion    Assessment/Plan:  1. Encounter for completion of form with patient  -biometric screening form for [employer] requiring TC, HDL, glucose labs  -pt to return for lab only appt on 12/30/19 and get BP reading for form  -will fax form to: [phone]    2. Essential hypertension  -BP today: 148/ 102; 149/100, not checking at home  -no current therapy  -trial: chlorthalidone 25mg daily  -advised to monitor BP at home and keep log; bring to CPE with Dr. Snow Ronquillo on 1/13/20  -discussed healthy diet and increasing daily exercise    RTC 12/30/19 for lab only and BP check   HPI:    Rocio Delgado has gained 14bs since 4/2019  Current therapy: none   BP today: 148/102  Checking BP at home    No Chest Pain  No Palpitations  No Dizziness  No Tightness  No SOB  No fatigue    Biometric screening form for [employer]  Waist = 35.5\"  Needs HDL, TC and glucose     3 most recent PHQ Screens 12/27/2019   Little interest or pleasure in doing things Not at all   Feeling down, depressed, irritable, or hopeless Not at all   Total Score PHQ 2 0       Social History:  Nutrition: has been eating salty foods over holiday; past few months working on project and will be in meetings with a lot of snacks  Physical: none - plans to start   Occupation: [employer]    Social History     Tobacco Use   Smoking Status Never Smoker   Smokeless Tobacco Never Used     Social History     Substance and Sexual Activity   Alcohol Use Yes    Comment: occasionally monthly     Social History     Substance and Sexual Activity   Drug Use No       Review of Systems:  - Constitutional Symptoms: no fever/chills  - Eyes: no blurry vision or double vision  - Respiratory: no cough or wheezing  - Gastrointestinal: no dysphagia or abdominal pain  - Neurological: no numbness, tingling, or headaches  ROS is negative otherwise.     I reviewed the following:  Past Medical History: Diagnosis Date    Allergy, unspecified not elsewhere classified     Asthma childhood    Chicken pox childhood    Unspecified vitamin D deficiency 06/2011       Current Outpatient Medications   Medication Sig Dispense Refill    VENTOLIN HFA 90 mcg/actuation inhaler INHALE 2 PUFFS BY MOUTH EVERY FOUR (4) HOURS AS NEEDED FOR WHEEZING. 1 Inhaler 5    EPINEPHrine (EPIPEN) 0.3 mg/0.3 mL injection 0.3 mL by IntraMUSCular route once as needed. 2 Syringe 2    montelukast (SINGULAIR) 10 mg tablet Take 1 Tab by mouth daily. 30 Tab 2       Allergies   Allergen Reactions    Nut Flavor Hives    Nuts [Tree Nut] Anaphylaxis     Tongue swelling, sob, mouth and facial swelling. Patient states that she is allergic to all nuts. O: VS:   Visit Vitals  BP (!) 149/100 (BP 1 Location: Left arm, BP Patient Position: Sitting)   Pulse 70   Temp 98.1 °F (36.7 °C) (Oral)   Resp 16   Ht 5' 3\" (1.6 m)   Wt 189 lb 1.6 oz (85.8 kg)   LMP 12/14/2019   SpO2 99%   BMI 33.50 kg/m²     Data Reviewed:   Vitals 12/27/2019 4/4/2018 4/4/2018 8/5/2017 8/5/2017   Blood Pressure 149/100 140/90 170/113 177/122 176/106       GENERAL: Fran Armstrong is in no acute distress. Non-toxic. Well nourished. Well developed. Appropriately groomed. RESP: Breath sounds are symmetrical bilaterally. Unlabored without SOB. Speaking in full sentences. Clear to auscultation bilaterally anteriorly and posteriorly. No wheezes. No rales or rhonchi. CV: Normal rate. Regular rhythm. S1, S2 audible. No murmur noted. No rubs, clicks or gallops noted. HEME/LYMPH: Pedal pulses palpable 2+ x 4 extremities. No peripheral edema is noted. SKIN:  Skin is warm and dry. Turgor is normal. No petechiae, no purpura, no rash. No cyanosis. No mottling, jaundice or pallor. _______________________________________________________________________  I spent >25 minutes face to face with patient with >50% of time spent in counseling and coordinating care.   Patient education was done. Advised on nutrition, physical activity, weight management, tobacco, alcohol and safety. Counseling included discussion of diagnosis, differentials, treatment options, prescribed treatment, warning signs and follow up. Medication risks/benefits,interactions and alternatives discussed with patient.      Patient verbalized understanding and agreed to plan of care. Patient was given an after visit summary which included current diagnoses, medications and vital signs. Follow up as directed.

## 2019-12-27 NOTE — PATIENT INSTRUCTIONS
1) Healthy Weight  Body Mass Index is a noninvasive way to screen for weight and body fat. This is a mathematical calculation based on your height and weight. A healthy BMI is between 20% -24.9%. Your BMI is 33 %. There is a relationship between high BMI and various healthy problems, such as osteoarthritis, muscle pain, increased risk of cancer, diabetes, heart disease, stroke, hypertension, high cholesterol, sleep apnea, breathing problems, depression, which is why a healthy BMI is so important. In terms of weight loss, a 5-10% reduction in body weight over 3-6 months is a reasonable goal.  There would likely be improvements in risk for disease such as diabetes and heart disease, with this amount of weight loss. In order to lose weight, try reducing your daily intake of calories by decreasing portion size of food and increase exercise to help reduce weight. Eat 3-5 small meals per day instead of 3 large meals. A good tip to losing weight : DON'T EAT OR DRINK ANYTHING AFTER DINNER! Choose lean meats for protein source which include chicken, pork, and turkey. The recommended serving size for protein is a 2-3 oz serving (the size of your fist), and 1-1.5 oz of carbohydrate per meal (about 1 cup). Increase servings of fruits and vegetables. Limit processed carbohydrates, (i.e. most breads, crackers, pasta, chips, rice, breaded or battered food, etc). If you choose to eat carbohydrates, whole wheat, (instead of white) is a healthier option for bread, rice, and crackers. Avoid fried foods. Limit sugar. Do your best to avoid all sweetened beverages, such as alcohol, juice, sodas or sweet teas, drink water, unsweet tea, diet soda, or crystal light as options instead. (Don't drink more than 2 of the 12oz artificially sweetened drinks per day as these can increase hunger and make it more difficult to lose weight. The daily goal for water intake should be at least 64 oz/day for most people.      Daily exercise will also help with weight loss and overall health. A minimum of 150 minutes a week of moderate exercise is recommended (30 minutes per day). Make exercise a routine part of your day - for example, park in spaces far away from [facility], take stairs instead of elevator, if sitting for long periods, get up from chair and walk every hour. Recruit a friend or relative to exercise with you and keep you on schedule. It is much easier to exercise with a ingrid who will make sure you work out each day! Home DVDs can be a great way to work out, if you will do them (otherwise, they aren't worth the money!) [employer] is a eight week mixed martial arts (MMA) based workout. It has 5 main workout DVDs, each one is 45 minutes consisting of a 10 minute warm-up, five 5 minute workouts and a 6 minute stretching/cool down. It is easy to follow, with options to modify each move and you only need hand weights and some space to do the work out. Meal planning smart phone applications like Wheely can help plan healthy meals. Get 7-8 hours of sleep each night. For reliable dietary information, go to www. EATRIGHT.org.    You may wish to consider seeing the nutritionist at [facility] ([phone]), [facility] ([phone]) or [facility] ([phone]). Alternatively, you can dial [employer] at [phone] (Monday - Friday 9am - 5pm) for a complimentary (free) conversation about your diet. Meal plans, grocery list and tips for healthy eating can be sent to you upon request.     Free smart phone application to help manage weight loss: MyFitnessPal = tracks food intake, exercise and weight. Daily nutritional summary. Meal      2) Start chlorthalidone 25mg daily - take this medication in the morning, as it is a diuretic and will make you urinate. Make sure you are drinking at least 64oz of water to prevent dehydration.     Please check your blood pressure through the week at staggered times in the day. (If you check in the morning, it should be at least one hour after your morning blood pressure medications.)      Arm monitors are most accurate. If you use a wrist monitor, make sure your wrist is at heart level. You can bring your home monitor to your next visit and have it calibrated with the machine in the office to gauge your readings. Sit  with your feet uncrossed and relax for 5 minutes before taking your BP. Keep a written record of your blood pressure readings and bring it to each appointment. If your systolic (top) blood pressure is consistently greater than 140mmHg or less than 852IRNT of the diastolic (bottom) number is consistently greater than 90mmHg or less than 60mmHg then please schedule an office appointment. Work on healthy eating - no salt diet, more potassium (helps flush out sodium,making healthier heart and arteries) - and incorporating daily exercise into your routine. Cardiac symptoms that would need immediate attention include: uncomfortable pressure, squeezing, fullness or pain in the center of your chest. Pain or discomfort in one or both arms, the back, neck, jaw or stomach. Shortness of breath with or without chest discomfort, breaking out in a cold sweat, nausea or lightheadedness. 3) Make a lab only appt for 12/30/19 - will check blood pressure that morning. Will complete the biometric form after lab resulted. Labs  The following blood work was ordered today. Once the tests are completed, you will receive a letter, email or phone call with the results. If you have not heard from us within 10-14 days, please call the office for the results. Complete Blood Count (CBC) helps evaluate your overall health, including hemoglobin and red blood cells that carry oxygen, white blood cells that fight infection and platelets that help with clotting. Complete Metabolic Panel (CMP) assesses electrolytes, kidney and liver function.   (A Basic Metabolic Panel (BMP) does not include liver function.)  Electrolytes include sodium, potassium, calcium, and chloride. These are necessary for cell function and an imbalance can cause serious problems. BUN and creatinine are markers of kidney function. ALT and AST are markers of liver function. Hemoglobin A1c is a 3 month average of your blood sugar and used as a marker of your diabetes control. A normal value is less than 5.7%. Total Cholesterol is the total number of cholesterol particles in your blood, which includes triglycerides, HDL and LDL. A small amount of cholesterol is needed for the body's cells, hormones, and metabolism. Goal is less than 200. Triglycerides are the short term fats in your blood which are used for energy. Goal is less than 150. High Density Lipids (HDL) is the \"good\" cholesterol in your blood. HDL helps remove bad cholesterol from your blood. Goal is more than 40. Low Density Lipids (LDL) is the \"bad\" cholesterol in your blood. LDL can stick to your arteries, raising the risk for heart attack and stroke. Goal is less than 100    Urinalysis - this is a test of your urine to check your overall health. It is recommended as a part of a routine check up and screens for a variety of disorders, such as urinary tract infections, kidney disease and diabetes. Your thyroid is responsible for secreting hormones and regulating your metabolism. Thyroid Stimulating Hormone (TSH) is a test for thyroid function. TSH is a hormone made by the pituitary gland in the brain and tells your thyroid gland to make hormones and release them into your blood. If your thyroid isn't producing enough hormone, you may have symptoms such as unexplained weight gain, fatigue, hair loss. If your thyroid is producing too much hormone, you may have symptoms of diarrhea, heart palpitations, fatigue, unexplained weight loss. A normal TSH value is between 0.45 - 4.5.        Learning About High Blood Pressure  What is high blood pressure? Blood pressure is a measure of how hard the blood pushes against the walls of your arteries. It's normal for blood pressure to go up and down throughout the day, but if it stays up, you have high blood pressure. Another name for high blood pressure is hypertension. Two numbers tell you your blood pressure. The first number is the systolic pressure. It shows how hard the blood pushes when your heart is pumping. The second number is the diastolic pressure. It shows how hard the blood pushes between heartbeats, when your heart is relaxed and filling with blood. Your doctor will give you a goal for your blood pressure. Your goal will be based on your health and your age. High blood pressure (hypertension) means that the top number stays high, or the bottom number stays high, or both. High blood pressure increases the risk of stroke, heart attack, and other problems. You and your doctor will talk about your risks of these problems based on your blood pressure. What happens when you have high blood pressure? · Blood flows through your arteries with too much force. Over time, this damages the walls of your arteries. But you can't feel it. High blood pressure usually doesn't cause symptoms. · Fat and calcium start to build up in your arteries. This buildup is called plaque. Plaque makes your arteries narrower and stiffer. Blood can't flow through them as easily. · This lack of good blood flow starts to damage some of the organs in your body. This can lead to problems such as coronary artery disease and heart attack, heart failure, stroke, kidney failure, and eye damage. How can you prevent high blood pressure? · Stay at a healthy weight. · Try to limit how much sodium you eat to less than 2,300 milligrams (mg) a day. If you limit your sodium to 1,500 mg a day, you can lower your blood pressure even more.   ? Buy foods that are labeled \"unsalted,\" \"sodium-free,\" or \"low-sodium. \" Foods labeled \"reduced-sodium\" and \"light sodium\" may still have too much sodium. ? Flavor your food with garlic, lemon juice, onion, vinegar, herbs, and spices instead of salt. Do not use soy sauce, steak sauce, onion salt, garlic salt, mustard, or ketchup on your food. ? Use less salt (or none) when recipes call for it. You can often use half the salt a recipe calls for without losing flavor. · Be physically active. Get at least 30 minutes of exercise on most days of the week. Walking is a good choice. You also may want to do other activities, such as running, swimming, cycling, or playing tennis or team sports. · Limit alcohol to 2 drinks a day for men and 1 drink a day for women. · Eat plenty of fruits, vegetables, and low-fat dairy products. Eat less saturated and total fats. How is high blood pressure treated? · Your doctor will suggest making lifestyle changes to help your heart. For example, your doctor may ask you to eat healthy foods, quit smoking, lose extra weight, and be more active. · If lifestyle changes don't help enough, your doctor may recommend that you take medicine. · When blood pressure is very high, medicines are needed to lower it. Follow-up care is a key part of your treatment and safety. Be sure to make and go to all appointments, and call your doctor if you are having problems. It's also a good idea to know your test results and keep a list of the medicines you take. Where can you learn more? Go to http://jeremy-kizzy.info/. Enter P501 in the search box to learn more about \"Learning About High Blood Pressure. \"  Current as of: April 9, 2019  Content Version: 12.2  © 8095-3890 [employer]. Care instructions adapted under license by [employer] (which disclaims liability or warranty for this information).  If you have questions about a medical condition or this instruction, always ask your healthcare professional. Healthwise, Incorporated disclaims any warranty or liability for your use of this information. Learning About the [address] Diet  What is the Mediterranean diet? The Mediterranean diet is a style of eating rather than a diet plan. It features foods eaten in [place], [place], [place] and [place], and other countries along the [facility]. It emphasizes eating foods like fish, fruits, vegetables, beans, high-fiber breads and whole grains, nuts, and olive oil. This style of eating includes limited red meat, cheese, and sweets. Why choose the Mediterranean diet? A Mediterranean-style diet may improve heart health. It contains more fat than other heart-healthy diets. But the fats are mainly from nuts, unsaturated oils (such as fish oils and olive oil), and certain nut or seed oils (such as canola, soybean, or flaxseed oil). These fats may help protect the heart and blood vessels. How can you get started on the Mediterranean diet? Here are some things you can do to switch to a more Mediterranean way of eating. What to eat  · Eat a variety of fruits and vegetables each day, such as grapes, blueberries, tomatoes, broccoli, peppers, figs, olives, spinach, eggplant, beans, lentils, and chickpeas. · Eat a variety of whole-grain foods each day, such as oats, brown rice, and whole wheat bread, pasta, and couscous. · Eat fish at least 2 times a week. Try tuna, salmon, mackerel, lake trout, herring, or sardines. · Eat moderate amounts of low-fat dairy products, such as milk, cheese, or yogurt. · Eat moderate amounts of poultry and eggs. · Choose healthy (unsaturated) fats, such as nuts, olive oil, and certain nut or seed oils like canola, soybean, and flaxseed. · Limit unhealthy (saturated) fats, such as butter, palm oil, and coconut oil. And limit fats found in animal products, such as meat and dairy products made with whole milk.  Try to eat red meat only a few times a month in very small amounts. · Limit sweets and desserts to only a few times a week. This includes sugar-sweetened drinks like soda. The Mediterranean diet may also include red wine with your meal1 glass each day for women and up to 2 glasses a day for men. Tips for eating at home  · Use herbs, spices, garlic, lemon zest, and citrus juice instead of salt to add flavor to foods. · Add avocado slices to your sandwich instead of patricio. · Have fish for lunch or dinner instead of red meat. Brush the fish with olive oil, and broil or grill it. · Sprinkle your salad with seeds or nuts instead of cheese. · Cook with olive or canola oil instead of butter or oils that are high in saturated fat. · Switch from 2% milk or whole milk to 1% or fat-free milk. · Dip raw vegetables in a vinaigrette dressing or hummus instead of dips made from mayonnaise or sour cream.  · Have a piece of fruit for dessert instead of a piece of cake. Try baked apples, or have some dried fruit. Tips for eating out  · Try broiled, grilled, baked, or poached fish instead of having it fried or breaded. · Ask your  to have your meals prepared with olive oil instead of butter. · Order dishes made with marinara sauce or sauces made from olive oil. Avoid sauces made from cream or mayonnaise. · Choose whole-grain breads, whole wheat pasta and pizza crust, brown rice, beans, and lentils. · Cut back on butter or margarine on bread. Instead, you can dip your bread in a small amount of olive oil. · Ask for a side salad or grilled vegetables instead of french fries or chips. Where can you learn more? Go to http://jeremy-kizzy.info/. Enter [phone] in the search box to learn more about \"Learning About the Mediterranean Diet. \"  Current as of: November 7, 2018  Content Version: 12.2  © 8154-1311 [employer], [email or web address]. Care instructions adapted under license by [employer] (which disclaims liability or warranty for this information).  If you have questions about a medical condition or this instruction, always ask your healthcare professional. [place] [address] any warranty or liability for your use of this information.

## 2019-12-31 ENCOUNTER — DOCUMENTATION ONLY (OUTPATIENT)
Dept: FAMILY MEDICINE CLINIC | Age: 39
End: 2019-12-31

## 2019-12-31 NOTE — PROGRESS NOTES
Bouchra Smith is a 44 y.o. female  Patient in today to check B/P. Manual B/P 118/78  Right arm,sitting.

## 2020-01-01 LAB
ALBUMIN SERPL-MCNC: 4.3 G/DL (ref 3.5–5.5)
ALBUMIN/GLOB SERPL: 1.2 {RATIO} (ref 1.2–2.2)
ALP SERPL-CCNC: 93 IU/L (ref 39–117)
ALT SERPL-CCNC: 15 IU/L (ref 0–32)
APPEARANCE UR: ABNORMAL
AST SERPL-CCNC: 25 IU/L (ref 0–40)
BACTERIA #/AREA URNS HPF: ABNORMAL /[HPF]
BASOPHILS # BLD AUTO: 0 X10E3/UL (ref 0–0.2)
BASOPHILS NFR BLD AUTO: 1 %
BILIRUB SERPL-MCNC: 0.6 MG/DL (ref 0–1.2)
BILIRUB UR QL STRIP: NEGATIVE
BUN SERPL-MCNC: 14 MG/DL (ref 6–20)
BUN/CREAT SERPL: 11 (ref 9–23)
CALCIUM SERPL-MCNC: 9.8 MG/DL (ref 8.7–10.2)
CASTS URNS MICRO: ABNORMAL
CASTS URNS QL MICRO: PRESENT /LPF
CHLORIDE SERPL-SCNC: 99 MMOL/L (ref 96–106)
CHOLEST SERPL-MCNC: 169 MG/DL (ref 100–199)
CO2 SERPL-SCNC: 25 MMOL/L (ref 20–29)
COLOR UR: YELLOW
CREAT SERPL-MCNC: 1.3 MG/DL (ref 0.57–1)
EOSINOPHIL # BLD AUTO: 0 X10E3/UL (ref 0–0.4)
EOSINOPHIL NFR BLD AUTO: 1 %
EPI CELLS #/AREA URNS HPF: >10 /HPF (ref 0–10)
ERYTHROCYTE [DISTWIDTH] IN BLOOD BY AUTOMATED COUNT: 13.3 % (ref 12.3–15.4)
EST. AVERAGE GLUCOSE BLD GHB EST-MCNC: 103 MG/DL
GLOBULIN SER CALC-MCNC: 3.7 G/DL (ref 1.5–4.5)
GLUCOSE SERPL-MCNC: 69 MG/DL (ref 65–99)
GLUCOSE UR QL: NEGATIVE
HBA1C MFR BLD: 5.2 % (ref 4.8–5.6)
HCT VFR BLD AUTO: 41.9 % (ref 34–46.6)
HDLC SERPL-MCNC: 52 MG/DL
HGB BLD-MCNC: 13.8 G/DL (ref 11.1–15.9)
HGB UR QL STRIP: NEGATIVE
IMM GRANULOCYTES # BLD AUTO: 0 X10E3/UL (ref 0–0.1)
IMM GRANULOCYTES NFR BLD AUTO: 0 %
INTERPRETATION, 910389: NORMAL
INTERPRETATION: NORMAL
KETONES UR QL STRIP: NEGATIVE
LDLC SERPL CALC-MCNC: 103 MG/DL (ref 0–99)
LEUKOCYTE ESTERASE UR QL STRIP: NEGATIVE
LYMPHOCYTES # BLD AUTO: 1.1 X10E3/UL (ref 0.7–3.1)
LYMPHOCYTES NFR BLD AUTO: 16 %
MCH RBC QN AUTO: 28.2 PG (ref 26.6–33)
MCHC RBC AUTO-ENTMCNC: 32.9 G/DL (ref 31.5–35.7)
MCV RBC AUTO: 86 FL (ref 79–97)
MICRO URNS: ABNORMAL
MONOCYTES # BLD AUTO: 0.5 X10E3/UL (ref 0.1–0.9)
MONOCYTES NFR BLD AUTO: 8 %
MUCOUS THREADS URNS QL MICRO: PRESENT
NEUTROPHILS # BLD AUTO: 5 X10E3/UL (ref 1.4–7)
NEUTROPHILS NFR BLD AUTO: 74 %
NITRITE UR QL STRIP: NEGATIVE
PDF IMAGE, 910387: NORMAL
PH UR STRIP: 7 [PH] (ref 5–7.5)
PLATELET # BLD AUTO: 383 X10E3/UL (ref 150–450)
POTASSIUM SERPL-SCNC: 4 MMOL/L (ref 3.5–5.2)
PROT SERPL-MCNC: 8 G/DL (ref 6–8.5)
PROT UR QL STRIP: ABNORMAL
RBC # BLD AUTO: 4.9 X10E6/UL (ref 3.77–5.28)
RBC #/AREA URNS HPF: ABNORMAL /HPF (ref 0–2)
SODIUM SERPL-SCNC: 138 MMOL/L (ref 134–144)
SP GR UR: 1.02 (ref 1–1.03)
TRIGL SERPL-MCNC: 71 MG/DL (ref 0–149)
TSH SERPL DL<=0.005 MIU/L-ACNC: 2.75 UIU/ML (ref 0.45–4.5)
URINALYSIS REFLEX, 377202: ABNORMAL
UROBILINOGEN UR STRIP-MCNC: 0.2 MG/DL (ref 0.2–1)
VLDLC SERPL CALC-MCNC: 14 MG/DL (ref 5–40)
WBC # BLD AUTO: 6.8 X10E3/UL (ref 3.4–10.8)
WBC #/AREA URNS HPF: ABNORMAL /HPF (ref 0–5)

## 2020-01-02 ENCOUNTER — TELEPHONE (OUTPATIENT)
Dept: FAMILY MEDICINE CLINIC | Age: 40
End: 2020-01-02

## 2020-01-02 DIAGNOSIS — R80.9 PROTEINURIA, UNSPECIFIED TYPE: Primary | ICD-10-CM

## 2020-01-02 NOTE — TELEPHONE ENCOUNTER
Called, left vm for pt to return call to office.   (Informed pt labs and biometric screening have been reviewed and faxed.)

## 2020-02-02 DIAGNOSIS — R80.9 PROTEINURIA, UNSPECIFIED TYPE: ICD-10-CM

## 2021-04-30 ENCOUNTER — IMMUNIZATION (OUTPATIENT)
Dept: INTERNAL MEDICINE CLINIC | Age: 41
End: 2021-04-30
Payer: COMMERCIAL

## 2021-04-30 DIAGNOSIS — Z23 ENCOUNTER FOR IMMUNIZATION: Primary | ICD-10-CM

## 2021-04-30 PROCEDURE — 0001A COVID-19, MRNA, LNP-S, PF, 30MCG/0.3ML DOSE(PFIZER): CPT | Performed by: FAMILY MEDICINE

## 2021-04-30 PROCEDURE — 91300 COVID-19, MRNA, LNP-S, PF, 30MCG/0.3ML DOSE(PFIZER): CPT | Performed by: FAMILY MEDICINE

## 2021-05-21 ENCOUNTER — IMMUNIZATION (OUTPATIENT)
Dept: INTERNAL MEDICINE CLINIC | Age: 41
End: 2021-05-21
Payer: COMMERCIAL

## 2021-05-21 DIAGNOSIS — Z23 ENCOUNTER FOR IMMUNIZATION: Primary | ICD-10-CM

## 2021-05-21 PROCEDURE — 0002A COVID-19, MRNA, LNP-S, PF, 30MCG/0.3ML DOSE(PFIZER): CPT | Performed by: FAMILY MEDICINE

## 2021-05-21 PROCEDURE — 91300 COVID-19, MRNA, LNP-S, PF, 30MCG/0.3ML DOSE(PFIZER): CPT | Performed by: FAMILY MEDICINE

## 2021-06-11 ENCOUNTER — OFFICE VISIT (OUTPATIENT)
Dept: URGENT CARE | Age: 41
End: 2021-06-11
Payer: COMMERCIAL

## 2021-06-11 VITALS
RESPIRATION RATE: 17 BRPM | TEMPERATURE: 98.7 F | SYSTOLIC BLOOD PRESSURE: 176 MMHG | DIASTOLIC BLOOD PRESSURE: 100 MMHG | HEART RATE: 84 BPM | OXYGEN SATURATION: 99 %

## 2021-06-11 DIAGNOSIS — I10 ESSENTIAL HYPERTENSION: Primary | ICD-10-CM

## 2021-06-11 DIAGNOSIS — J45.990 EXERCISE-INDUCED ASTHMA: ICD-10-CM

## 2021-06-11 DIAGNOSIS — Z76.0 MEDICATION REFILL: ICD-10-CM

## 2021-06-11 PROCEDURE — 99203 OFFICE O/P NEW LOW 30 MIN: CPT | Performed by: FAMILY MEDICINE

## 2021-06-11 RX ORDER — CHLORTHALIDONE 25 MG/1
25 TABLET ORAL DAILY
Qty: 30 TABLET | Refills: 1 | Status: SHIPPED | OUTPATIENT
Start: 2021-06-11

## 2021-06-11 RX ORDER — ALBUTEROL SULFATE 90 UG/1
1 AEROSOL, METERED RESPIRATORY (INHALATION)
Qty: 1 INHALER | Refills: 0 | Status: SHIPPED | OUTPATIENT
Start: 2021-06-11

## 2021-06-11 NOTE — PROGRESS NOTES
Ary Jj is a 36 y.o. female who presents for chlorthalidone and albuterol inhaler refills. Ran out of BP med 90 days ago. Denies CP, HA, vision change, N/V, SOB. Uses albuterol prior to exercise and just ran out. Missed last appointment with new PCP, will reschedule today. The history is provided by the patient. Past Medical History:   Diagnosis Date    Allergy, unspecified not elsewhere classified     Asthma childhood    Chicken pox childhood    Unspecified vitamin D deficiency 06/2011        History reviewed. No pertinent surgical history. Family History   Problem Relation Age of Onset    Diabetes Maternal Grandmother     Heart Attack Maternal Grandmother     Stroke Maternal Grandmother     Hypertension Mother     Asthma Mother         Social History     Socioeconomic History    Marital status: SINGLE     Spouse name: Not on file    Number of children: Not on file    Years of education: Not on file    Highest education level: Not on file   Occupational History    Not on file   Tobacco Use    Smoking status: Never Smoker    Smokeless tobacco: Never Used   Substance and Sexual Activity    Alcohol use: Yes     Comment: occasionally monthly    Drug use: No    Sexual activity: Yes     Partners: Male     Birth control/protection: Condom   Other Topics Concern    Not on file   Social History Narrative    Not on file     Social Determinants of Health     Financial Resource Strain:     Difficulty of Paying Living Expenses:    Food Insecurity:     Worried About Running Out of Food in the Last Year:     [address] in the Last Year:    Transportation Needs:     Lack of Transportation (Medical):      Lack of Transportation (Non-Medical):    Physical Activity:     Days of Exercise per Week:     Minutes of Exercise per Session:    Stress:     Feeling of Stress :    Social Connections:     Frequency of Communication with Friends and Family:     Frequency of Social Gatherings with Friends and Family:     Attends [employer] Services:     Active Member of Clubs or Organizations:     Attends Club or Organization Meetings:     Marital Status:    Intimate Partner Violence:     Fear of Current or Ex-Partner:     Emotionally Abused:     Physically Abused:     Sexually Abused: ALLERGIES: Nut flavor and Nuts [tree nut]    Review of Systems   Eyes: Negative for visual disturbance. Respiratory: Negative for shortness of breath. Cardiovascular: Negative for chest pain. Gastrointestinal: Negative for nausea and vomiting. Neurological: Negative for headaches. Vitals:    06/11/21 1032   BP: (!) 176/100   Pulse: 84   Resp: 17   Temp: 98.7 °F (37.1 °C)   SpO2: 99%       Physical Exam  Vitals and nursing note reviewed. Constitutional:       General: She is not in acute distress. Appearance: She is well-developed. She is not diaphoretic. Pulmonary:      Effort: Pulmonary effort is normal.   Neurological:      Mental Status: She is alert. Psychiatric:         Behavior: Behavior normal.         Thought Content: Thought content normal.         Judgment: Judgment normal.         MDM    ICD-10-CM ICD-9-CM   1. Essential hypertension  I10 401.9   2. Exercise-induced asthma  J45.990 493.81   3. Medication refill  Z76.0 V68.1       Orders Placed This Encounter    chlorthalidone (HYGROTON) 25 mg tablet     Sig: Take 1 Tablet by mouth daily. Dispense:  30 Tablet     Refill:  1    albuterol (Ventolin HFA) 90 mcg/actuation inhaler     Sig: Take 1 Puff by inhalation every six (6) hours as needed for Wheezing. Dispense:  1 Inhaler     Refill:  0      BP elevated  Resumed BP medication, keep BP log and take with you to PCP appointment which she is to schedule as soon as possible    Refilled albuterol for exercise induced asthma    If signs and symptoms become worse the pt is to go to the ER.          Procedures

## 2021-09-01 ENCOUNTER — OFFICE VISIT (OUTPATIENT)
Dept: INTERNAL MEDICINE CLINIC | Age: 41
End: 2021-09-01
Payer: COMMERCIAL

## 2021-09-01 VITALS
SYSTOLIC BLOOD PRESSURE: 133 MMHG | HEART RATE: 64 BPM | WEIGHT: 190 LBS | BODY MASS INDEX: 33.66 KG/M2 | DIASTOLIC BLOOD PRESSURE: 94 MMHG | RESPIRATION RATE: 20 BRPM | TEMPERATURE: 97.4 F | HEIGHT: 63 IN | OXYGEN SATURATION: 100 %

## 2021-09-01 DIAGNOSIS — Z91.018 NUT ALLERGY: ICD-10-CM

## 2021-09-01 DIAGNOSIS — Z00.00 PHYSICAL EXAM: ICD-10-CM

## 2021-09-01 DIAGNOSIS — E55.9 VITAMIN D DEFICIENCY: ICD-10-CM

## 2021-09-01 DIAGNOSIS — Z12.31 ENCOUNTER FOR SCREENING MAMMOGRAM FOR MALIGNANT NEOPLASM OF BREAST: Primary | ICD-10-CM

## 2021-09-01 DIAGNOSIS — Z23 ENCOUNTER FOR IMMUNIZATION: ICD-10-CM

## 2021-09-01 PROCEDURE — 99386 PREV VISIT NEW AGE 40-64: CPT | Performed by: INTERNAL MEDICINE

## 2021-09-01 PROCEDURE — 90471 IMMUNIZATION ADMIN: CPT | Performed by: INTERNAL MEDICINE

## 2021-09-01 PROCEDURE — 90715 TDAP VACCINE 7 YRS/> IM: CPT | Performed by: INTERNAL MEDICINE

## 2021-09-01 RX ORDER — EPINEPHRINE 0.3 MG/.3ML
0.3 INJECTION SUBCUTANEOUS
Qty: 2 EACH | Refills: 0 | Status: SHIPPED | OUTPATIENT
Start: 2021-09-01 | End: 2021-09-01

## 2021-09-01 NOTE — PROGRESS NOTES
Deandre Addison is a 36 y.o. female    Chief Complaint   Patient presents with   [address]     1. Have you been to the ER, urgent care clinic since your last visit? Hospitalized since your last visit? No      2. Have you seen or consulted any other health care providers outside of the [address] [place] [address] since your last visit? Include any pap smears or colon screening.   No

## 2021-09-01 NOTE — PROGRESS NOTES
Subjective:      Marcus Katz is a 36 y.o. female who presents today for   Chief Complaint   Patient presents with    hospitals Care     Hypertension- patient has strong family history. She is taking chlorthalidone but does not take it daily    Asthma- takes albuterol as needed  She has infrequent flares. She takes albuterol with exercise as needed      Has regular menstrual cycles, she does says she craves red meat. Not sure of history of anemia    Vit D deficiency currently does not take a supplement    Pap smear due    Mammogram    Vaccines:   Flu advised  tdap due today  covid x 2 phizer completed  Patient Active Problem List    Diagnosis Date Noted    Essential hypertension 12/27/2019    Nut allergy 04/04/2018    Allergic rhinitis 04/04/2018    Mass of right foot 04/04/2018    Elevated blood pressure reading without diagnosis of hypertension 12/31/2012    Unspecified vitamin D deficiency 07/11/2012    Exercise-induced asthma 05/12/2011    Allergy, unspecified not elsewhere classified      Current Outpatient Medications   Medication Sig Dispense Refill    chlorthalidone (HYGROTON) 25 mg tablet Take 1 Tablet by mouth daily. 30 Tablet 1    albuterol (Ventolin HFA) 90 mcg/actuation inhaler Take 1 Puff by inhalation every six (6) hours as needed for Wheezing. 1 Inhaler 0    EPINEPHrine (EPIPEN) 0.3 mg/0.3 mL injection 0.3 mL by IntraMUSCular route once as needed. 2 Syringe 2     Allergies   Allergen Reactions    Nut Flavor Hives    Nuts [Tree Nut] Anaphylaxis     Tongue swelling, sob, mouth and facial swelling. Patient states that she is allergic to all nuts. Past Medical History:   Diagnosis Date    Allergy, unspecified not elsewhere classified     Asthma childhood    Chicken pox childhood    Unspecified vitamin D deficiency 06/2011     History reviewed. No pertinent surgical history.   Family History   Problem Relation Age of Onset    Diabetes Maternal Grandmother     Heart Attack Maternal Grandmother     Stroke Maternal Grandmother     Hypertension Mother     Asthma Mother      Social History     Tobacco Use    Smoking status: Never Smoker    Smokeless tobacco: Never Used   Substance Use Topics    Alcohol use: Yes     Comment: occasionally monthly    patient works for [employer]. Patient is in long term relationship    Review of Systems    A comprehensive review of systems was negative except for that written in the HPI. Objective:     Visit Vitals  BP (!) 133/94 (BP 1 Location: Left upper arm, BP Patient Position: Sitting)   Pulse 64   Temp 97.4 °F (36.3 °C) (Oral)   Resp 20   Ht 5' 3\" (1.6 m)   Wt 190 lb (86.2 kg)   LMP 09/01/2021 (Exact Date)   SpO2 100%   BMI 33.66 kg/m²     General:  Alert, cooperative, no distress, appears stated age. Head:  Normocephalic, without obvious abnormality, atraumatic. Eyes:  Conjunctivae/corneas clear. PERRL, EOMs intact. Fundi benign. Ears:  Normal TMs and external ear canals both ears. Nose: Nares normal. Septum midline. Mucosa normal. No drainage or sinus tenderness. Throat: Lips, mucosa, and tongue normal. Teeth and gums normal.   Neck: Supple, symmetrical, trachea midline, no adenopathy, thyroid: no enlargement/tenderness/nodules, no carotid bruit and no JVD. Back:   Symmetric, no curvature. ROM normal. No CVA tenderness. Lungs:   Clear to auscultation bilaterally. Chest wall:  No tenderness or deformity. Heart:  Regular rate and rhythm, S1, S2 normal, no murmur, click, rub or gallop. Abdomen:   Soft, non-tender. Bowel sounds normal. No masses,  No organomegaly. Extremities: Extremities normal, atraumatic, no cyanosis or edema. Pulses: 2+ and symmetric all extremities. Skin: Skin color, texture, turgor normal. No rashes or lesions. Lymph nodes: Cervical, supraclavicular, and axillary nodes normal.   Neurologic: CNII-XII intact. Normal strength, sensation and reflexes throughout.        Assessment/Plan: ICD-10-CM ICD-9-CM    1. Encounter for screening mammogram for malignant neoplasm of breast  Z12.31 V76.12 [facility] MAMMO BI SCREENING INCL CAD   2. Nut allergy  Z91.018 V15.05 EPINEPHrine (EPIPEN) 0.3 mg/0.3 mL injection   3. Physical exam  Z00.00 V70.9 CBC WITH AUTOMATED DIFF      HEMOGLOBIN A1C WITH EAG      HEMOGLOBIN A1C WITH EAG      CBC WITH AUTOMATED DIFF      CANCELED: METABOLIC PANEL, COMPREHENSIVE      CANCELED: LIPID PANEL      CANCELED: URINALYSIS W/ RFLX MICROSCOPIC      CANCELED: URINALYSIS W/ RFLX MICROSCOPIC      CANCELED: LIPID PANEL      CANCELED: METABOLIC PANEL, COMPREHENSIVE   4. Vitamin D deficiency  E55.9 268.9 CANCELED: VITAMIN D, 25 HYDROXY      CANCELED: VITAMIN D, 25 HYDROXY   5. Encounter for immunization  Z23 V03.89 TETANUS, DIPHTHERIA TOXOIDS AND ACELLULAR PERTUSSIS VACCINE (TDAP), IN INDIVIDS. >=7, IM          Advised her to call back or return to office if symptoms worsen/change/persist.  Discussed expected course/resolution/complications of diagnosis in detail with patient. Medication risks/benefits/costs/interactions/alternatives discussed with patient. She was given an after visit summary which includes diagnoses, current medications, & vitals. She expressed understanding with the diagnosis and plan.

## 2021-09-02 LAB
BASOPHILS # BLD: 0.1 K/UL (ref 0–0.1)
BASOPHILS NFR BLD: 1 % (ref 0–1)
DIFFERENTIAL METHOD BLD: NORMAL
EOSINOPHIL # BLD: 0.1 K/UL (ref 0–0.4)
EOSINOPHIL NFR BLD: 1 % (ref 0–7)
ERYTHROCYTE [DISTWIDTH] IN BLOOD BY AUTOMATED COUNT: 13.8 % (ref 11.5–14.5)
EST. AVERAGE GLUCOSE BLD GHB EST-MCNC: 111 MG/DL
HBA1C MFR BLD: 5.5 % (ref 4–5.6)
HCT VFR BLD AUTO: 39.9 % (ref 35–47)
HGB BLD-MCNC: 12.4 G/DL (ref 11.5–16)
IMM GRANULOCYTES # BLD AUTO: 0 K/UL (ref 0–0.04)
IMM GRANULOCYTES NFR BLD AUTO: 0 % (ref 0–0.5)
LYMPHOCYTES # BLD: 1.5 K/UL (ref 0.8–3.5)
LYMPHOCYTES NFR BLD: 28 % (ref 12–49)
MCH RBC QN AUTO: 28.4 PG (ref 26–34)
MCHC RBC AUTO-ENTMCNC: 31.1 G/DL (ref 30–36.5)
MCV RBC AUTO: 91.5 FL (ref 80–99)
MONOCYTES # BLD: 0.4 K/UL (ref 0–1)
MONOCYTES NFR BLD: 8 % (ref 5–13)
NEUTS SEG # BLD: 3.5 K/UL (ref 1.8–8)
NEUTS SEG NFR BLD: 62 % (ref 32–75)
NRBC # BLD: 0 K/UL (ref 0–0.01)
NRBC BLD-RTO: 0 PER 100 WBC
PLATELET # BLD AUTO: 382 K/UL (ref 150–400)
PMV BLD AUTO: 11.6 FL (ref 8.9–12.9)
RBC # BLD AUTO: 4.36 M/UL (ref 3.8–5.2)
WBC # BLD AUTO: 5.5 K/UL (ref 3.6–11)

## 2022-03-19 PROBLEM — R22.41 MASS OF RIGHT FOOT: Status: ACTIVE | Noted: 2018-04-04

## 2022-03-19 PROBLEM — Z91.018 NUT ALLERGY: Status: ACTIVE | Noted: 2018-04-04

## 2022-03-19 PROBLEM — J30.9 ALLERGIC RHINITIS: Status: ACTIVE | Noted: 2018-04-04

## 2022-03-19 PROBLEM — I10 ESSENTIAL HYPERTENSION: Status: ACTIVE | Noted: 2019-12-27

## 2022-12-29 NOTE — PROGRESS NOTES
Dermatology Rooming Note    Garry Ferreira's goals for this visit include:   Chief Complaint   Patient presents with     Hair Loss     Garry is here for an Alopecia follow-up. States condition has remained the same since last seen.     Teto White, EMT     Identified pt with two pt identifiers(name and ). Reviewed record in preparation for visit and have obtained necessary documentation. Chief Complaint   Patient presents with    Other        Health Maintenance Due   Topic    Pneumococcal 0-64 years (1 of 1 - PPSV23)    PAP AKA CERVICAL CYTOLOGY     Influenza Age 5 to Adult         Visit Vitals  BP (!) 149/100 (BP 1 Location: Left arm, BP Patient Position: Sitting)   Pulse 70   Temp 98.1 °F (36.7 °C) (Oral)   Resp 16   Ht 5' 3\" (1.6 m)   Wt 189 lb 1.6 oz (85.8 kg)   LMP 2019   SpO2 99%   BMI 33.50 kg/m²     Pain Scale: 0 - No pain/10    Coordination of Care Questionnaire:  :   1. Have you been to the ER, urgent care clinic since your last visit? Hospitalized since your last visit? No    2. Have you seen or consulted any other health care providers outside of the [address] [place] [address] since your last visit? Include any pap smears or colon screening.  No

## 2023-05-11 RX ORDER — ALBUTEROL SULFATE 90 UG/1
1 AEROSOL, METERED RESPIRATORY (INHALATION) EVERY 6 HOURS PRN
COMMUNITY
Start: 2021-06-11

## 2023-05-11 RX ORDER — CHLORTHALIDONE 25 MG/1
1 TABLET ORAL DAILY
COMMUNITY
Start: 2021-06-11